# Patient Record
Sex: MALE | Race: BLACK OR AFRICAN AMERICAN
[De-identification: names, ages, dates, MRNs, and addresses within clinical notes are randomized per-mention and may not be internally consistent; named-entity substitution may affect disease eponyms.]

---

## 2019-03-05 ENCOUNTER — HOSPITAL ENCOUNTER (OUTPATIENT)
Dept: HOSPITAL 62 - RAD | Age: 49
End: 2019-03-05
Attending: PHYSICIAN ASSISTANT
Payer: MEDICARE

## 2019-03-05 DIAGNOSIS — M79.10: ICD-10-CM

## 2019-03-05 DIAGNOSIS — M51.37: Primary | ICD-10-CM

## 2019-03-05 PROCEDURE — 72148 MRI LUMBAR SPINE W/O DYE: CPT

## 2019-03-05 NOTE — RADIOLOGY REPORT (SQ)
EXAM DESCRIPTION:  MRI LUMBAR SPINE WITHOUT



COMPLETED DATE/TIME:  3/5/2019 1:09 pm



REASON FOR STUDY:  M51.37 OTHER INTERVERTEBRAL DISC DEGENERATION, LUMBOSACRAL REGION M79.10 M51.37  O
THER INTERVERTEBRAL DISC DEGENERATION, LUMBOSACRAL R



COMPARISON:  None.



TECHNIQUE:  Sagittal and Axial imaging includes T1, T2, STIR and gradient echo sequences. Coronal T2/
HASTE imaging.



LIMITATIONS:  None.



FINDINGS:  VISUALIZED UPPER ABDOMEN:  Limited evaluation. No acute or suspicious findings suggested.

SEGMENTATION: No transitional anatomy. The lowest well-developed disc space is labeled L5-S1.

ALIGNMENT: Anatomic.

VERTEBRAE: Intact.

BONE MARROW: Normal. No marrow replacement or reactive changes.

DISC SIGNAL: Normal. No significant abnormal signal or loss of height.

POSTERIOR ELEMENTS:  Generally intact.  No pars defect evident.

HARDWARE: None in the spine.

CORD AND CONUS: Normal in size and signal intensity. Conus at the L1-2 level.

SOFT TISSUES: No aortic aneurysm seen. No bulky retroperitoneal adenopathy or mass. No paraspinal mas
s or fluid.

T12-L1:  Unremarkable

L1-L2: Unremarkable

L2-L3: Mild bilateral facet arthropathy.  No central or foraminal stenosis

L3-L4: Mild posterior diffuse disc bulging, mild bilateral facet arthropathy.  Mild bilateral inferio
r foraminal narrowing without exiting L3 nerve root impingement.

L4-L5: Mild diffuse posterior disc bulging, moderate bilateral facet and ligament hypertrophy.  No ce
ntral stenosis.  Moderate bilateral foraminal narrowing without definite exiting L4 nerve root imping
ement.

L5-S1: Minimal posterior disc bulging, mild bilateral facet hypertrophy.  No central stenosis.  Mild 
bilateral foraminal narrowing without exit L5 nerve root impingement

SACRUM: Visualized upper sacrum intact.

OTHER: No other significant findings.



IMPRESSION:  Mild lower lumbar degenerative changes as above



TECHNICAL DOCUMENTATION:  JOB ID:  [id number]

 2011 [employer]- All Rights Reserved



Reading location - IP/workstation name: [email or web address]